# Patient Record
Sex: MALE | Race: WHITE | NOT HISPANIC OR LATINO | URBAN - METROPOLITAN AREA
[De-identification: names, ages, dates, MRNs, and addresses within clinical notes are randomized per-mention and may not be internally consistent; named-entity substitution may affect disease eponyms.]

---

## 2018-08-15 ENCOUNTER — OFFICE VISIT (OUTPATIENT)
Dept: AUDIOLOGY | Facility: CLINIC | Age: 73
End: 2018-08-15
Payer: MEDICARE

## 2018-08-15 DIAGNOSIS — R42 DIZZINESS AND GIDDINESS: Primary | ICD-10-CM

## 2018-08-15 PROCEDURE — 92540 BASIC VESTIBULAR EVALUATION: CPT | Performed by: AUDIOLOGIST

## 2018-08-15 PROCEDURE — 92537 CALORIC VSTBLR TEST W/REC: CPT | Performed by: AUDIOLOGIST

## 2018-08-15 NOTE — PROGRESS NOTES
Videonystagmography (VNG) Evaluation    Name:  Rony Ramirez  :  1945  Age:  81 y o  Date of Evaluation: 08/15/18     History: Dizziness  Reason for visit: Rony Ramirez is seen today at the request of Dr Christal Goldman for an evaluation of hearing  Patient complains of episodic dizziness and disorientation often associated with physical activity or movement  Mr Enedina Cano states that the episodes are sometimes accompanied by nausea, arm weakness and blurry vision  He reports that 2 weeks ago, he experienced a brief episode of vertigo followed by hours of imbalance  His imbalance issues have been present for the past 18 months, but the episodes of vertigo have only recently begun    Mr Enedina Cano has visited his PCP, ENT, cardiologist and neurologist regarding his imbalance, and recalls that all scans performed were normal     Tympanometry:   Right: Type A - normal middle ear pressure and compliance   Left: Type A - normal middle ear pressure and compliance    Oculomotor battery:    Gaze:          Right: Normal        Left: Normal         Up: Normal        Down: Normal      Saccades: Borderline Abnormal Velocity and Latency     Tracking: Borderline Abnormal Gain     Optokinetic: Abnormal Symmetry and Gain -- to the left, right eye only was abnormal (repeated with  same result)    Positioning/Positionals:     Delorise Georgis:    Right: Negative      Left: Negative        Positionals:     Sitting: Normal    Supine: Normal    Head Right: Normal, 2 degrees LB nystagmus    Head Left: Normal    Body Right: Normal, 4 degrees LB nystagmus    Body Left[de-identified] Normal      Calorics:     Bithermal Caloric Irrigation: Normal     **See attached report    Recommendations: Consider vestibular physcial therapy evaluation and rehabilitation through [facility]  Follow up with managing physician        Oumou Omer, Audiology Intern  [facility] , [address]  Clinical Audiologist

## 2018-08-15 NOTE — LETTER
August 15, 2018     William Zuluaga MD  [facility] [address]  [id number]    Patient: Neftaly Argueta   YOB: 1945   Date of Visit: 8/15/2018       Dear Dr Teixeira Box: Thank you for referring Neftaly Argueta to me for evaluation  Below are my notes for this consultation  If you have questions, please do not hesitate to call me  I look forward to following your patient along with you  Sincerely,        Apoorva Villa        CC: DO Yvon Munoz  8/15/2018  2:57 PM  Sign at close encounter  Videonystagmography (VNG) Evaluation    Name:  Neftaly Argueta  :  1945  Age:  81 y o  Date of Evaluation: 08/15/18     History: Dizziness  Reason for visit: Neftaly Argueta is seen today at the request of Dr Nichole Reyes for an evaluation of hearing  Patient complains of episodic dizziness and disorientation often associated with physical activity or movement  Mr Alycia Mosqueda states that the episodes are sometimes accompanied by nausea, arm weakness and blurry vision  He reports that 2 weeks ago, he experienced a brief episode of vertigo followed by hours of imbalance  His imbalance issues have been present for the past 18 months, but the episodes of vertigo have only recently begun    Mr Alycia Mosqueda has visited his PCP, ENT, cardiologist and neurologist regarding his imbalance, and recalls that all scans performed were normal     Tympanometry:   Right: Type A - normal middle ear pressure and compliance   Left: Type A - normal middle ear pressure and compliance    Oculomotor battery:    Gaze:          Right: Normal        Left: Normal         Up: Normal        Down: Normal      Saccades: Borderline Abnormal Velocity and Latency     Tracking: Borderline Abnormal Gain     Optokinetic: Abnormal Symmetry and Gain -- to the left, right eye only was abnormal (repeated with  same result)    Positioning/Positionals:     Veleria Faye:    Right: Negative      Left: Negative        Positionals: Sitting: Normal    Supine: Normal    Head Right: Normal, 2 degrees LB nystagmus    Head Left: Normal    Body Right: Normal, 4 degrees LB nystagmus    Body Left[de-identified] Normal      Calorics:     Bithermal Caloric Irrigation: Normal     **See attached report    Recommendations: Consider vestibular physcial therapy evaluation and rehabilitation through [address] [facility]  Follow up with managing physician        Raymond Littlejohn, Audiology Intern  [facility] , [address]  Clinical Audiologist